# Patient Record
Sex: FEMALE | Race: WHITE | NOT HISPANIC OR LATINO | Employment: UNEMPLOYED | URBAN - METROPOLITAN AREA
[De-identification: names, ages, dates, MRNs, and addresses within clinical notes are randomized per-mention and may not be internally consistent; named-entity substitution may affect disease eponyms.]

---
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
## 2023-01-18 ENCOUNTER — ROUTINE PRENATAL (OUTPATIENT)
Dept: OBSTETRICS AND GYNECOLOGY | Facility: CLINIC | Age: 18
End: 2023-01-18
Payer: COMMERCIAL

## 2023-01-18 VITALS — BODY MASS INDEX: 22.38 KG/M2 | SYSTOLIC BLOOD PRESSURE: 114 MMHG | WEIGHT: 165 LBS | DIASTOLIC BLOOD PRESSURE: 81 MMHG

## 2023-01-18 DIAGNOSIS — O09.32 LATE PRENATAL CARE AFFECTING PREGNANCY IN SECOND TRIMESTER: ICD-10-CM

## 2023-01-18 DIAGNOSIS — R55 VASOVAGAL SYNCOPE: ICD-10-CM

## 2023-01-18 DIAGNOSIS — Z34.02 ENCOUNTER FOR SUPERVISION OF NORMAL FIRST PREGNANCY IN SECOND TRIMESTER: Primary | ICD-10-CM

## 2023-01-18 LAB
GLUCOSE UR STRIP-MCNC: NEGATIVE MG/DL
PROT UR STRIP-MCNC: ABNORMAL MG/DL

## 2023-01-18 PROCEDURE — 99213 OFFICE O/P EST LOW 20 MIN: CPT | Performed by: OBSTETRICS & GYNECOLOGY

## 2023-01-18 NOTE — PROGRESS NOTES
OB follow up     Shelley Lackey is a 17 y.o.  28w2d being seen today for her obstetrical visit.  Patient reports no complaints. Fetal movement: normal.    Her prenatal care is complicated by (and status): syncopal episodes     Review of Systems  Cramping/contractions : none   Vaginal bleeding: none   Fetal movement good     /81   Wt 74.8 kg (165 lb)   LMP 2022 (Approximate)   BMI 22.38 kg/m²     FHT: 134 BPM   Uterine Size: 26 cm       Assessment    Diagnoses and all orders for this visit:    1. Encounter for supervision of normal first pregnancy in second trimester (Primary)    2. Late prenatal care affecting pregnancy in second trimester    3. Vasovagal syncope  -     Ambulatory Referral to Cardiology        1) pregnancy at 28w2d   Rh +, needs 1 hr GTT/CBC   Peds, prenatal classes and tours encouraged  Recommend TDaP and Flu   Encouraged questions about L&D, anesthesia, breast feeding and birth control   2) Hx of recurrent near syncopal and syncopal episodes   Refer cardiology   3) Late onset prenatal care   Compliant since.   BP better today         Plan    Reviewed this stage of pregnancy  Problem list updated   Follow up in 2 weeks    Ramez Bailey MD   2023  15:01 EST

## 2023-01-18 NOTE — Clinical Note
Mindy, having recurrent vasovagal syncopal episodes. Can you see if cardiology could evaluate? Let her know. Thanks, Dr. Bailey

## 2023-01-19 ENCOUNTER — TELEPHONE (OUTPATIENT)
Dept: OBSTETRICS AND GYNECOLOGY | Facility: CLINIC | Age: 18
End: 2023-01-19
Payer: COMMERCIAL

## 2023-01-19 NOTE — TELEPHONE ENCOUNTER
----- Message from Ramez Bailey MD sent at 1/18/2023  3:00 PM EST -----  Mindy, having recurrent vasovagal syncopal episodes. Can you see if cardiology could evaluate? Let her know. Thanks, Dr. Bailey

## 2023-01-19 NOTE — TELEPHONE ENCOUNTER
Pt informed of referral to cardiology.  She will expect a call from [facility] to schedule an appointment.     Office # [phone]

## 2023-01-27 ENCOUNTER — TELEPHONE (OUTPATIENT)
Dept: OBSTETRICS AND GYNECOLOGY | Facility: CLINIC | Age: 18
End: 2023-01-27
Payer: COMMERCIAL

## 2023-01-27 ENCOUNTER — OFFICE VISIT (OUTPATIENT)
Dept: CARDIOLOGY | Facility: CLINIC | Age: 18
End: 2023-01-27
Payer: COMMERCIAL

## 2023-01-27 VITALS
BODY MASS INDEX: 23.13 KG/M2 | HEART RATE: 89 BPM | DIASTOLIC BLOOD PRESSURE: 82 MMHG | WEIGHT: 170.8 LBS | HEIGHT: 72 IN | SYSTOLIC BLOOD PRESSURE: 110 MMHG

## 2023-01-27 DIAGNOSIS — R55 SYNCOPE AND COLLAPSE: Primary | ICD-10-CM

## 2023-01-27 PROCEDURE — 99203 OFFICE O/P NEW LOW 30 MIN: CPT | Performed by: INTERNAL MEDICINE

## 2023-01-27 PROCEDURE — 93000 ELECTROCARDIOGRAM COMPLETE: CPT | Performed by: INTERNAL MEDICINE

## 2023-01-27 NOTE — TELEPHONE ENCOUNTER
----- Message from Rhea Clark MA sent at 1/26/2023  2:38 PM EST -----  L/m for pt/nayeli  ----- Message -----  From: Ramez Bailey MD  Sent: 1/26/2023   1:01 PM EST  To: ANA Gagnon, not anemic on her BS/CBC screen. Passed her glucose test. Please let her know. Thanks, Dr. Bailey

## 2023-01-27 NOTE — PROGRESS NOTES
Subjective:     Encounter Date:01/27/23      Patient ID: Shelley Lackey is a 17 y.o. female.    Chief Complaint:  History of Present Illness    Dear Dr. Bailey,    I had the pleasure of seeing this patient in the office today for initial evaluation and consultation.  I appreciate that you sent her in to see us.  They come in today to be seen for for concern of vasovagal syncope.    Patient is currently 29 weeks pregnant.    Patient has had repeated episodes of syncope.  They state this started about 4 months into her pregnancy.  She has never had this before.  Most of the time she sitting down.  It is occurred many times in the car.  She will blackout, really not have any symptoms prior.  No complaints of chest pain or palpitations.  She will have witnessed shaking in both upper extremities; they have been concerned that she is having seizures.  No loss of bowel or bladder control.  She then will wake up after about a minute and will not have any recollection of what happened.    She has had occasional dizziness on standing.  Never had that before.    She was sent to see us for concern and evaluation of possible vasovagal syncope.    This patient has no known cardiac history.  This patient has no history of coronary artery disease, congestive heart failure, rheumatic fever, rheumatic heart disease, congenital heart disease or heart murmur.  This patient has never required invasive cardiovascular evaluation.        The following portions of the patient's history were reviewed and updated as appropriate: allergies, current medications, past family history, past medical history, past social history, past surgical history and problem list.      ECG 12 Lead    Date/Time: 1/27/2023 9:57 AM  Performed by: Sb Galicia III, MD  Authorized by: Sb Galicia III, MD   Comparison: compared with previous ECG   Similar to previous ECG  Rhythm: sinus rhythm  Rate: normal  Conduction: conduction normal  ST Segments: ST  "segments normal  T Waves: T waves normal  QRS axis: normal  Other: no other findings    Clinical impression: normal ECG               Objective:     Vitals:    01/27/23 0928   BP: 110/82   BP Location: Left arm   Patient Position: Sitting   Pulse: 89   Weight: 77.5 kg (170 lb 12.8 oz)   Height: 182.9 cm (72\")     Body mass index is 23.16 kg/m².      Vitals reviewed.   Constitutional:       General: Not in acute distress.     Appearance: Well-developed. Not diaphoretic.   Eyes:      General:         Right eye: No discharge.         Left eye: No discharge.      Conjunctiva/sclera: Conjunctivae normal.      Pupils: Pupils are equal, round, and reactive to light.   HENT:      Head: Normocephalic and atraumatic.      Nose: Nose normal.   Neck:      Thyroid: No thyromegaly.      Trachea: No tracheal deviation.      Lymphadenopathy: No cervical adenopathy.   Pulmonary:      Effort: Pulmonary effort is normal. No respiratory distress.      Breath sounds: Normal breath sounds. No stridor.   Chest:      Chest wall: Not tender to palpatation.   Cardiovascular:      Normal rate. Regular rhythm.      Murmurs: There is no murmur.      . No S3 gallop. No click. No rub.   Pulses:     Intact distal pulses.   Abdominal:      General: Bowel sounds are normal. There is no distension.      Palpations: Abdomen is soft. There is no abdominal mass.      Tenderness: There is no abdominal tenderness. There is no guarding or rebound.   Musculoskeletal: Normal range of motion.         General: No tenderness or deformity.      Cervical back: Normal range of motion and neck supple. Skin:     General: Skin is warm and dry.      Findings: No erythema or rash.   Neurological:      Mental Status: Alert and oriented to person, place, and time.      Deep Tendon Reflexes: Reflexes are normal and symmetric.   Psychiatric:         Thought Content: Thought content normal.         Data and records reviewed:     Lab Results   Component Value Date    GLUCOSE " 68 12/12/2022    BUN 12 12/12/2022    CREATININE 0.52 (L) 12/12/2022    BCR 23.1 12/12/2022    K 4.2 12/12/2022    CO2 25.0 12/12/2022    CALCIUM 9.6 12/12/2022    PROTENTOTREF 6.2 12/12/2022    ALBUMIN 3.70 12/12/2022    LABIL2 1.5 12/12/2022    AST 13 12/12/2022    ALT 10 12/12/2022     No results found for: CHOL  No results found for: TRIG  No results found for: HDL  No results found for: LDL  No results found for: VLDL  No results found for: LDLHDL  CBC    CBC 11/14/22 12/12/22 1/25/23   WBC 8.6 7.53 8.9   RBC 3.93 3.89 3.80   Hemoglobin 12.4 12.2 11.9   Hematocrit 35.1 35.3 34.5   MCV 89 90.7 91   MCH 31.6 31.4 31.3   MCHC 35.3 34.6 34.5   RDW 12.7 12.1 (A) 11.6 (A)   Platelets 283 337 301   (A) Abnormal value            No radiology results for the last 90 days.          Assessment:          Diagnosis Plan   1. Syncope and collapse  Tilt Table    ECG 12 Lead             Plan:       Syncope and collapse, they are concerned about possible seizures, there is question whether this could be orthostasis and vasovagal syncope.  Story is not classic for that, we will arrange for a tilt table test to be performed.    Thank you very much for allowing us to participate in the care of this pleasant patient.  Please don't hesitate to call if I can be of assistance in any way.      Current Outpatient Medications:   •  Prenatal 28-0.8 MG tablet, Take 1 tablet by mouth Daily. Please use formulary or generic, with DHA ideal, Disp: 90 each, Rfl: 3  •  promethazine (PHENERGAN) 25 MG tablet, Take 1 tablet by mouth Every 6 (Six) Hours As Needed for Nausea or Vomiting., Disp: 30 tablet, Rfl: 2         No follow-ups on file.

## 2023-02-01 ENCOUNTER — TELEPHONE (OUTPATIENT)
Dept: OBSTETRICS AND GYNECOLOGY | Facility: CLINIC | Age: 18
End: 2023-02-01
Payer: COMMERCIAL

## 2023-02-01 ENCOUNTER — ROUTINE PRENATAL (OUTPATIENT)
Dept: OBSTETRICS AND GYNECOLOGY | Facility: CLINIC | Age: 18
End: 2023-02-01
Payer: COMMERCIAL

## 2023-02-01 VITALS — SYSTOLIC BLOOD PRESSURE: 129 MMHG | DIASTOLIC BLOOD PRESSURE: 70 MMHG | BODY MASS INDEX: 22.78 KG/M2 | WEIGHT: 168 LBS

## 2023-02-01 DIAGNOSIS — O26.843 FUNDAL HEIGHT LOW FOR DATES IN THIRD TRIMESTER: ICD-10-CM

## 2023-02-01 DIAGNOSIS — R55 VASOVAGAL SYNCOPE: ICD-10-CM

## 2023-02-01 DIAGNOSIS — Z34.03 ENCOUNTER FOR SUPERVISION OF NORMAL FIRST PREGNANCY IN THIRD TRIMESTER: Primary | ICD-10-CM

## 2023-02-01 LAB
GLUCOSE UR STRIP-MCNC: NEGATIVE MG/DL
PROT UR STRIP-MCNC: NEGATIVE MG/DL

## 2023-02-01 PROCEDURE — 99213 OFFICE O/P EST LOW 20 MIN: CPT | Performed by: OBSTETRICS & GYNECOLOGY

## 2023-02-01 NOTE — PROGRESS NOTES
OB follow up     Shelley Lackey is a 17 y.o.  30w2d being seen today for her obstetrical visit.  Patient reports no complaints. Fetal movement: normal.    Her prenatal care is complicated by (and status): vasovagal syncope     Review of Systems  Cramping/contractions : none   Vaginal bleeding: none   Fetal movement good     /70   Wt 76.2 kg (168 lb)   LMP 2022 (Approximate)   BMI 22.78 kg/m²     FHT: 154 BPM   Uterine Size: 27 cm       Assessment    Diagnoses and all orders for this visit:    1. Encounter for supervision of normal first pregnancy in third trimester (Primary)    2. Vasovagal syncope    3. Fundal height low for dates in third trimester  -     US Ob Follow Up Transabdominal Approach        1) pregnancy at 30w2d   Rh+, BS good and not anemic   2) Vasovagal syncope   Cardiology note appreciated  Wanted tilt table test. So will see if we can help arrange   3) FH low   Check growth next visit.     Watch weight gain, > expected currently         Plan    Reviewed this stage of pregnancy  Problem list updated   Follow up in 2 weeks    Ramez Bailey MD   2023  11:42 EST

## 2023-02-01 NOTE — TELEPHONE ENCOUNTER
----- Message from Ramez Bailey MD sent at 2/1/2023 11:43 AM EST -----  Mindy, cardiology recommended tilt table test. She has not been contacted by hospital to set up? Anything we can do to help. Thanks, Dr. Bailey

## 2023-02-20 ENCOUNTER — ROUTINE PRENATAL (OUTPATIENT)
Dept: OBSTETRICS AND GYNECOLOGY | Facility: CLINIC | Age: 18
End: 2023-02-20
Payer: COMMERCIAL

## 2023-02-20 VITALS — DIASTOLIC BLOOD PRESSURE: 68 MMHG | WEIGHT: 175 LBS | BODY MASS INDEX: 23.73 KG/M2 | SYSTOLIC BLOOD PRESSURE: 104 MMHG

## 2023-02-20 DIAGNOSIS — O26.843 FUNDAL HEIGHT LOW FOR DATES IN THIRD TRIMESTER: ICD-10-CM

## 2023-02-20 DIAGNOSIS — Z34.03 ENCOUNTER FOR SUPERVISION OF NORMAL FIRST PREGNANCY IN THIRD TRIMESTER: Primary | ICD-10-CM

## 2023-02-20 PROCEDURE — 99213 OFFICE O/P EST LOW 20 MIN: CPT | Performed by: OBSTETRICS & GYNECOLOGY

## 2023-02-20 NOTE — PROGRESS NOTES
OB follow up     Shelley Lackey is a 17 y.o.  33w0d being seen today for her obstetrical visit.  Patient reports no complaints. Fetal movement: normal.    Her prenatal care is complicated by (and status): vasovagal syncope     Review of Systems  Cramping/contractions : none   Vaginal bleeding: none   Fetal movement good     /68   Wt 79.4 kg (175 lb)   LMP 2022 (Approximate)   BMI 23.73 kg/m²     FHT: 128 BPM   Uterine Size: 28 cm       Assessment    Diagnoses and all orders for this visit:    1. Encounter for supervision of normal first pregnancy in third trimester (Primary)    2. Fundal height low for dates in third trimester        1) pregnancy at 33w0d   2) FH low   Growth AGA 43%, A/C 33%, cephalic and normal ELIZ   3) Vasovagal syncope   Good overall - no recent episodes       Plan    Reviewed this stage of pregnancy  Problem list updated   Follow up in 2 weeks    Ramez Bailey MD   2023  15:17 EST

## 2023-03-08 ENCOUNTER — ROUTINE PRENATAL (OUTPATIENT)
Dept: OBSTETRICS AND GYNECOLOGY | Facility: CLINIC | Age: 18
End: 2023-03-08
Payer: COMMERCIAL

## 2023-03-08 VITALS — BODY MASS INDEX: 24.55 KG/M2 | WEIGHT: 181 LBS | DIASTOLIC BLOOD PRESSURE: 73 MMHG | SYSTOLIC BLOOD PRESSURE: 125 MMHG

## 2023-03-08 DIAGNOSIS — O26.03 EXCESSIVE WEIGHT GAIN DURING PREGNANCY IN THIRD TRIMESTER: ICD-10-CM

## 2023-03-08 DIAGNOSIS — Z34.03 ENCOUNTER FOR SUPERVISION OF NORMAL FIRST PREGNANCY IN THIRD TRIMESTER: Primary | ICD-10-CM

## 2023-03-08 LAB
GLUCOSE UR STRIP-MCNC: NEGATIVE MG/DL
PROT UR STRIP-MCNC: ABNORMAL MG/DL

## 2023-03-08 PROCEDURE — 90715 TDAP VACCINE 7 YRS/> IM: CPT | Performed by: OBSTETRICS & GYNECOLOGY

## 2023-03-08 PROCEDURE — 90471 IMMUNIZATION ADMIN: CPT | Performed by: OBSTETRICS & GYNECOLOGY

## 2023-03-08 PROCEDURE — 99213 OFFICE O/P EST LOW 20 MIN: CPT | Performed by: OBSTETRICS & GYNECOLOGY

## 2023-03-08 NOTE — PROGRESS NOTES
Ob follow up    Shelley Lackey is a 17 y.o.  35w2d patient being seen today for her obstetrical visit. Patient reports no complaints. Fetal movement: normal.    Her prenatal care is complicated by (and status) : uncomplicated       ROS -   Fetal Movement good   Vaginal bleeding none   Cramping/Contractions none      /73   Wt 82.1 kg (181 lb)   LMP 2022 (Approximate)   BMI 24.55 kg/m²     FHT:  134 BPM    Uterine Size: 32 cm   Presentations: unsure   Pelvic Exam:     Dilation: deferred    Effacement: deferred    Station:  deferred                 Assessment    Diagnoses and all orders for this visit:    1. Encounter for supervision of normal first pregnancy in third trimester (Primary)    2. Excessive weight gain during pregnancy in third trimester    Other orders  -     Tdap Vaccine Greater Than or Equal To 6yo IM        1) Pregnancy at 35w2d  2) Fetal status reassuring -  3) GBS status - to do @ 36 weeks   4) Excessive weight gain in pregnancy   Currently > 40#   Goal of < 0.5# per week reviewed.   Why to limit weight gain discussed.     Plan    [facility] [place] [facility] reviewed         Ramez Bailey MD   3/8/2023  13:01 EST

## 2023-03-13 ENCOUNTER — PATIENT MESSAGE (OUTPATIENT)
Dept: LABOR AND DELIVERY | Facility: HOSPITAL | Age: 18
End: 2023-03-13
Payer: COMMERCIAL

## 2023-03-13 ENCOUNTER — PATIENT OUTREACH (OUTPATIENT)
Dept: LABOR AND DELIVERY | Facility: HOSPITAL | Age: 18
End: 2023-03-13
Payer: COMMERCIAL

## 2023-03-13 NOTE — OUTREACH NOTE
[facility]    Provided Speed [employer] plus 36 wk packets to pt today.  Has never called me back after 3 attempts for intake by [facility].  Will follow for delivery.    Jose Fernández RN  Maternity Nurse Navigator    3/13/2023, 14:54 EDT

## 2023-03-15 ENCOUNTER — ROUTINE PRENATAL (OUTPATIENT)
Dept: OBSTETRICS AND GYNECOLOGY | Facility: CLINIC | Age: 18
End: 2023-03-15
Payer: COMMERCIAL

## 2023-03-15 VITALS — WEIGHT: 179 LBS | SYSTOLIC BLOOD PRESSURE: 126 MMHG | BODY MASS INDEX: 24.28 KG/M2 | DIASTOLIC BLOOD PRESSURE: 79 MMHG

## 2023-03-15 DIAGNOSIS — Z36.9 ANTENATAL SCREENING ENCOUNTER: ICD-10-CM

## 2023-03-15 DIAGNOSIS — O26.03 EXCESSIVE WEIGHT GAIN DURING PREGNANCY IN THIRD TRIMESTER: ICD-10-CM

## 2023-03-15 DIAGNOSIS — Z34.03 ENCOUNTER FOR SUPERVISION OF NORMAL FIRST PREGNANCY IN THIRD TRIMESTER: Primary | ICD-10-CM

## 2023-03-15 LAB
GLUCOSE UR STRIP-MCNC: NEGATIVE MG/DL
PROT UR STRIP-MCNC: ABNORMAL MG/DL

## 2023-03-15 PROCEDURE — 99213 OFFICE O/P EST LOW 20 MIN: CPT | Performed by: OBSTETRICS & GYNECOLOGY

## 2023-03-15 NOTE — PROGRESS NOTES
Ob follow up    Shelley Lackey is a 17 y.o.  36w2d patient being seen today for her obstetrical visit. Patient reports no complaints. Fetal movement: normal.    Her prenatal care is complicated by (and status) : uncomplicated       ROS -   Fetal Movement good   Vaginal bleeding none   Cramping/Contractions none      /79   Wt 81.2 kg (179 lb)   LMP 2022 (Approximate)   BMI 24.28 kg/m²     FHT:  144 BPM    Uterine Size: 33 cm   Presentations: cephalic   Pelvic Exam:     Dilation: 2cm    Effacement: 50%    Station:  -2                 Assessment    Diagnoses and all orders for this visit:    1. Encounter for supervision of normal first pregnancy in third trimester (Primary)    2. Excessive weight gain during pregnancy in third trimester    3.  screening encounter  -     Strep B Screen - , Vaginal/Rectum        1) Pregnancy at 36w2d  2) Fetal status reassuring   3) GBS status -  Done today  4) excessive gain  Good interval change.   Continue to monitor     Plan    Labor warnings   [facility] [place] BID        Ramez Bailey MD   3/15/2023  09:47 EDT

## 2023-03-17 LAB — GP B STREP DNA SPEC QL NAA+PROBE: POSITIVE

## 2023-03-20 PROBLEM — O99.820 GBS (GROUP B STREPTOCOCCUS CARRIER), +RV CULTURE, CURRENTLY PREGNANT: Status: ACTIVE | Noted: 2023-03-20

## 2023-03-22 ENCOUNTER — ROUTINE PRENATAL (OUTPATIENT)
Dept: OBSTETRICS AND GYNECOLOGY | Facility: CLINIC | Age: 18
End: 2023-03-22
Payer: COMMERCIAL

## 2023-03-22 VITALS — DIASTOLIC BLOOD PRESSURE: 76 MMHG | SYSTOLIC BLOOD PRESSURE: 115 MMHG | WEIGHT: 182 LBS | BODY MASS INDEX: 24.68 KG/M2

## 2023-03-22 DIAGNOSIS — O26.03 EXCESSIVE WEIGHT GAIN DURING PREGNANCY IN THIRD TRIMESTER: ICD-10-CM

## 2023-03-22 DIAGNOSIS — O99.820 GBS (GROUP B STREPTOCOCCUS CARRIER), +RV CULTURE, CURRENTLY PREGNANT: ICD-10-CM

## 2023-03-22 DIAGNOSIS — Z34.03 ENCOUNTER FOR SUPERVISION OF NORMAL FIRST PREGNANCY IN THIRD TRIMESTER: Primary | ICD-10-CM

## 2023-03-22 LAB
GLUCOSE UR STRIP-MCNC: NEGATIVE MG/DL
PROT UR STRIP-MCNC: ABNORMAL MG/DL

## 2023-03-22 NOTE — PROGRESS NOTES
Ob follow up    Shelley Lackey is a 17 y.o.  37w2d patient being seen today for her obstetrical visit. Patient reports no complaints. Fetal movement: normal.    Her prenatal care is complicated by (and status) : GBS      ROS -   Fetal Movement good   Vaginal bleeding none   Cramping/Contractions intermittent      /76   Wt 82.6 kg (182 lb)   LMP 2022 (Approximate)   BMI 24.68 kg/m²     FHT:  134 BPM    Uterine Size: 33 cm   Presentations: cephalic   Pelvic Exam:     Dilation: 2cm    Effacement: 50%    Station:  -2                 Assessment    Diagnoses and all orders for this visit:    1. Encounter for supervision of normal first pregnancy in third trimester (Primary)    2. Excessive weight gain during pregnancy in third trimester    3. GBS (group B Streptococcus carrier), +RV culture, currently pregnant        1) Pregnancy at 37w2d  2) Fetal status reassuring   3) GBS status - Positive - reviewed why test, when treat  4) excess weight gain    Consider induction 4/10/23         Plan    Labor warnings   [facility] [place] [place] [facility]        Ramez Bailey MD   3/22/2023  10:07 EDT

## 2023-03-28 ENCOUNTER — HOSPITAL ENCOUNTER (EMERGENCY)
Facility: HOSPITAL | Age: 18
Discharge: HOME | End: 2023-03-28
Attending: OBSTETRICS & GYNECOLOGY | Admitting: OBSTETRICS & GYNECOLOGY
Payer: COMMERCIAL

## 2023-03-28 ENCOUNTER — HOSPITAL ENCOUNTER (INPATIENT)
Facility: HOSPITAL | Age: 18
LOS: 2 days | Discharge: HOME | End: 2023-03-30
Attending: OBSTETRICS & GYNECOLOGY | Admitting: OBSTETRICS & GYNECOLOGY
Payer: COMMERCIAL

## 2023-03-28 ENCOUNTER — ANESTHESIA EVENT (OUTPATIENT)
Dept: LABOR AND DELIVERY | Facility: HOSPITAL | Age: 18
End: 2023-03-28
Payer: COMMERCIAL

## 2023-03-28 ENCOUNTER — ANESTHESIA (OUTPATIENT)
Dept: LABOR AND DELIVERY | Facility: HOSPITAL | Age: 18
End: 2023-03-28
Payer: COMMERCIAL

## 2023-03-28 VITALS
BODY MASS INDEX: 25.47 KG/M2 | WEIGHT: 188 LBS | SYSTOLIC BLOOD PRESSURE: 131 MMHG | RESPIRATION RATE: 18 BRPM | TEMPERATURE: 98.7 F | DIASTOLIC BLOOD PRESSURE: 78 MMHG | HEART RATE: 84 BPM | HEIGHT: 72 IN

## 2023-03-28 PROBLEM — Z34.90 PREGNANCY: Status: ACTIVE | Noted: 2023-03-28

## 2023-03-28 LAB
ABO GROUP BLD: NORMAL
ALBUMIN SERPL-MCNC: 3.8 G/DL (ref 3.5–5.2)
ALBUMIN/GLOB SERPL: 1.2 G/DL
ALP SERPL-CCNC: 147 U/L (ref 39–117)
ALT SERPL W P-5'-P-CCNC: 7 U/L (ref 1–33)
ANION GAP SERPL CALCULATED.3IONS-SCNC: 18 MMOL/L (ref 5–15)
AST SERPL-CCNC: 15 U/L (ref 1–32)
BACTERIA UR QL AUTO: ABNORMAL /HPF
BASOPHILS # BLD AUTO: 0.03 10*3/MM3 (ref 0–0.2)
BASOPHILS NFR BLD AUTO: 0.2 % (ref 0–1.5)
BILIRUB SERPL-MCNC: 0.4 MG/DL (ref 0–1.2)
BILIRUB UR QL STRIP: NEGATIVE
BLD GP AB SCN SERPL QL: NEGATIVE
BUN SERPL-MCNC: 8 MG/DL (ref 6–20)
BUN/CREAT SERPL: 10.5 (ref 7–25)
CALCIUM SPEC-SCNC: 9.3 MG/DL (ref 8.6–10.5)
CHLORIDE SERPL-SCNC: 98 MMOL/L (ref 98–107)
CLARITY UR: ABNORMAL
CO2 SERPL-SCNC: 20 MMOL/L (ref 22–29)
COLOR UR: YELLOW
CREAT SERPL-MCNC: 0.76 MG/DL (ref 0.57–1)
DEPRECATED RDW RBC AUTO: 38 FL (ref 37–54)
EGFRCR SERPLBLD CKD-EPI 2021: 115.9 ML/MIN/1.73
EOSINOPHIL # BLD AUTO: 0 10*3/MM3 (ref 0–0.4)
EOSINOPHIL NFR BLD AUTO: 0 % (ref 0.3–6.2)
ERYTHROCYTE [DISTWIDTH] IN BLOOD BY AUTOMATED COUNT: 11.7 % (ref 12.3–15.4)
GLOBULIN UR ELPH-MCNC: 3.3 GM/DL
GLUCOSE SERPL-MCNC: 142 MG/DL (ref 65–99)
GLUCOSE UR STRIP-MCNC: NEGATIVE MG/DL
HCT VFR BLD AUTO: 39.8 % (ref 34–46.6)
HGB BLD-MCNC: 13.9 G/DL (ref 12–15.9)
HGB UR QL STRIP.AUTO: ABNORMAL
HYALINE CASTS UR QL AUTO: ABNORMAL /LPF
IMM GRANULOCYTES # BLD AUTO: 0.1 10*3/MM3 (ref 0–0.05)
IMM GRANULOCYTES NFR BLD AUTO: 0.6 % (ref 0–0.5)
KETONES UR QL STRIP: NEGATIVE
LEUKOCYTE ESTERASE UR QL STRIP.AUTO: ABNORMAL
LYMPHOCYTES # BLD AUTO: 0.73 10*3/MM3 (ref 0.7–3.1)
LYMPHOCYTES NFR BLD AUTO: 4.3 % (ref 19.6–45.3)
MCH RBC QN AUTO: 31.2 PG (ref 26.6–33)
MCHC RBC AUTO-ENTMCNC: 34.9 G/DL (ref 31.5–35.7)
MCV RBC AUTO: 89.2 FL (ref 79–97)
MONOCYTES # BLD AUTO: 0.42 10*3/MM3 (ref 0.1–0.9)
MONOCYTES NFR BLD AUTO: 2.5 % (ref 5–12)
NEUTROPHILS NFR BLD AUTO: 15.53 10*3/MM3 (ref 1.7–7)
NEUTROPHILS NFR BLD AUTO: 92.4 % (ref 42.7–76)
NITRITE UR QL STRIP: NEGATIVE
NRBC BLD AUTO-RTO: 0 /100 WBC (ref 0–0.2)
PH UR STRIP.AUTO: 7 [PH] (ref 5–8)
PLATELET # BLD AUTO: 280 10*3/MM3 (ref 140–450)
PMV BLD AUTO: 9.8 FL (ref 6–12)
POTASSIUM SERPL-SCNC: 3.5 MMOL/L (ref 3.5–5.2)
PROT SERPL-MCNC: 7.1 G/DL (ref 6–8.5)
PROT UR QL STRIP: ABNORMAL
RBC # BLD AUTO: 4.46 10*6/MM3 (ref 3.77–5.28)
RBC # UR STRIP: ABNORMAL /HPF
REF LAB TEST METHOD: ABNORMAL
RH BLD: POSITIVE
SODIUM SERPL-SCNC: 136 MMOL/L (ref 136–145)
SP GR UR STRIP: 1.02 (ref 1–1.03)
SQUAMOUS #/AREA URNS HPF: ABNORMAL /HPF
T&S EXPIRATION DATE: NORMAL
UROBILINOGEN UR QL STRIP: ABNORMAL
WBC # UR STRIP: ABNORMAL /HPF
WBC NRBC COR # BLD: 16.81 10*3/MM3 (ref 3.4–10.8)

## 2023-03-28 PROCEDURE — 87086 URINE CULTURE/COLONY COUNT: CPT | Performed by: OBSTETRICS & GYNECOLOGY

## 2023-03-28 PROCEDURE — 81001 URINALYSIS AUTO W/SCOPE: CPT | Performed by: OBSTETRICS & GYNECOLOGY

## 2023-03-28 PROCEDURE — 59025 FETAL NON-STRESS TEST: CPT

## 2023-03-28 PROCEDURE — S0260 H&P FOR SURGERY: HCPCS | Performed by: OBSTETRICS & GYNECOLOGY

## 2023-03-28 PROCEDURE — 86850 RBC ANTIBODY SCREEN: CPT | Performed by: OBSTETRICS & GYNECOLOGY

## 2023-03-28 PROCEDURE — 80053 COMPREHEN METABOLIC PANEL: CPT | Performed by: OBSTETRICS & GYNECOLOGY

## 2023-03-28 PROCEDURE — C1755 CATHETER, INTRASPINAL: HCPCS | Performed by: ANESTHESIOLOGY

## 2023-03-28 PROCEDURE — 85025 COMPLETE CBC W/AUTO DIFF WBC: CPT | Performed by: OBSTETRICS & GYNECOLOGY

## 2023-03-28 PROCEDURE — 0UQKXZZ REPAIR HYMEN, EXTERNAL APPROACH: ICD-10-PCS | Performed by: OBSTETRICS & GYNECOLOGY

## 2023-03-28 PROCEDURE — 99284 EMERGENCY DEPT VISIT MOD MDM: CPT | Performed by: OBSTETRICS & GYNECOLOGY

## 2023-03-28 PROCEDURE — 86901 BLOOD TYPING SEROLOGIC RH(D): CPT | Performed by: OBSTETRICS & GYNECOLOGY

## 2023-03-28 PROCEDURE — 86900 BLOOD TYPING SEROLOGIC ABO: CPT | Performed by: OBSTETRICS & GYNECOLOGY

## 2023-03-28 PROCEDURE — 99202 OFFICE O/P NEW SF 15 MIN: CPT | Performed by: OBSTETRICS & GYNECOLOGY

## 2023-03-28 PROCEDURE — 59410 OBSTETRICAL CARE: CPT | Performed by: OBSTETRICS & GYNECOLOGY

## 2023-03-28 PROCEDURE — 25010000002 PENICILLIN G POTASSIUM PER 600000 UNITS: Performed by: OBSTETRICS & GYNECOLOGY

## 2023-03-28 RX ORDER — ERYTHROMYCIN 5 MG/G
OINTMENT OPHTHALMIC
Status: ACTIVE
Start: 2023-03-28 | End: 2023-03-29

## 2023-03-28 RX ORDER — PENICILLIN G 3000000 [IU]/50ML
3 INJECTION, SOLUTION INTRAVENOUS EVERY 4 HOURS
Status: DISCONTINUED | OUTPATIENT
Start: 2023-03-28 | End: 2023-03-28 | Stop reason: HOSPADM

## 2023-03-28 RX ORDER — SODIUM CHLORIDE 0.9 % (FLUSH) 0.9 %
1-10 SYRINGE (ML) INJECTION AS NEEDED
Status: DISCONTINUED | OUTPATIENT
Start: 2023-03-28 | End: 2023-03-30 | Stop reason: HOSPADM

## 2023-03-28 RX ORDER — IBUPROFEN 600 MG/1
600 TABLET ORAL EVERY 6 HOURS PRN
Status: DISCONTINUED | OUTPATIENT
Start: 2023-03-28 | End: 2023-03-30 | Stop reason: HOSPADM

## 2023-03-28 RX ORDER — SODIUM CHLORIDE, SODIUM LACTATE, POTASSIUM CHLORIDE, CALCIUM CHLORIDE 600; 310; 30; 20 MG/100ML; MG/100ML; MG/100ML; MG/100ML
125 INJECTION, SOLUTION INTRAVENOUS CONTINUOUS
Status: DISCONTINUED | OUTPATIENT
Start: 2023-03-28 | End: 2023-03-28

## 2023-03-28 RX ORDER — BISACODYL 10 MG
10 SUPPOSITORY, RECTAL RECTAL DAILY PRN
Status: DISCONTINUED | OUTPATIENT
Start: 2023-03-29 | End: 2023-03-30 | Stop reason: HOSPADM

## 2023-03-28 RX ORDER — ONDANSETRON 2 MG/ML
4 INJECTION INTRAMUSCULAR; INTRAVENOUS EVERY 6 HOURS PRN
Status: DISCONTINUED | OUTPATIENT
Start: 2023-03-28 | End: 2023-03-28 | Stop reason: HOSPADM

## 2023-03-28 RX ORDER — TRANEXAMIC ACID 10 MG/ML
1000 INJECTION, SOLUTION INTRAVENOUS ONCE AS NEEDED
Status: DISCONTINUED | OUTPATIENT
Start: 2023-03-28 | End: 2023-03-28

## 2023-03-28 RX ORDER — EPHEDRINE SULFATE 50 MG/ML
5 INJECTION, SOLUTION INTRAVENOUS
Status: DISCONTINUED | OUTPATIENT
Start: 2023-03-28 | End: 2023-03-28 | Stop reason: HOSPADM

## 2023-03-28 RX ORDER — DOCUSATE SODIUM 100 MG/1
100 CAPSULE, LIQUID FILLED ORAL 2 TIMES DAILY
Status: DISCONTINUED | OUTPATIENT
Start: 2023-03-28 | End: 2023-03-30 | Stop reason: HOSPADM

## 2023-03-28 RX ORDER — ONDANSETRON 2 MG/ML
4 INJECTION INTRAMUSCULAR; INTRAVENOUS EVERY 6 HOURS PRN
Status: DISCONTINUED | OUTPATIENT
Start: 2023-03-28 | End: 2023-03-30 | Stop reason: HOSPADM

## 2023-03-28 RX ORDER — ONDANSETRON 4 MG/1
4 TABLET, FILM COATED ORAL EVERY 6 HOURS PRN
Status: DISCONTINUED | OUTPATIENT
Start: 2023-03-28 | End: 2023-03-30 | Stop reason: HOSPADM

## 2023-03-28 RX ORDER — ACETAMINOPHEN 325 MG/1
650 TABLET ORAL EVERY 6 HOURS PRN
Status: DISCONTINUED | OUTPATIENT
Start: 2023-03-28 | End: 2023-03-30 | Stop reason: HOSPADM

## 2023-03-28 RX ORDER — ONDANSETRON 2 MG/ML
4 INJECTION INTRAMUSCULAR; INTRAVENOUS ONCE AS NEEDED
Status: DISCONTINUED | OUTPATIENT
Start: 2023-03-28 | End: 2023-03-28 | Stop reason: HOSPADM

## 2023-03-28 RX ORDER — PROMETHAZINE HYDROCHLORIDE 25 MG/1
25 TABLET ORAL EVERY 6 HOURS PRN
Status: DISCONTINUED | OUTPATIENT
Start: 2023-03-28 | End: 2023-03-30 | Stop reason: HOSPADM

## 2023-03-28 RX ORDER — PHYTONADIONE 1 MG/.5ML
INJECTION, EMULSION INTRAMUSCULAR; INTRAVENOUS; SUBCUTANEOUS
Status: ACTIVE
Start: 2023-03-28 | End: 2023-03-29

## 2023-03-28 RX ORDER — FENTANYL CIT 0.2 MG/100ML-ROPIV 0.2%-NACL 0.9% EPIDURAL INJ 2/0.2 MCG/ML-%
10 SOLUTION INJECTION CONTINUOUS
Status: DISCONTINUED | OUTPATIENT
Start: 2023-03-28 | End: 2023-03-28

## 2023-03-28 RX ORDER — ONDANSETRON 4 MG/1
4 TABLET, FILM COATED ORAL EVERY 6 HOURS PRN
Status: DISCONTINUED | OUTPATIENT
Start: 2023-03-28 | End: 2023-03-28 | Stop reason: HOSPADM

## 2023-03-28 RX ORDER — DIPHENHYDRAMINE HYDROCHLORIDE 50 MG/ML
12.5 INJECTION INTRAMUSCULAR; INTRAVENOUS EVERY 8 HOURS PRN
Status: DISCONTINUED | OUTPATIENT
Start: 2023-03-28 | End: 2023-03-28 | Stop reason: HOSPADM

## 2023-03-28 RX ORDER — HYDROCORTISONE 25 MG/G
1 CREAM TOPICAL AS NEEDED
Status: DISCONTINUED | OUTPATIENT
Start: 2023-03-28 | End: 2023-03-30 | Stop reason: HOSPADM

## 2023-03-28 RX ORDER — TERBUTALINE SULFATE 1 MG/ML
0.25 INJECTION, SOLUTION SUBCUTANEOUS AS NEEDED
Status: DISCONTINUED | OUTPATIENT
Start: 2023-03-28 | End: 2023-03-28 | Stop reason: HOSPADM

## 2023-03-28 RX ORDER — PRENATAL VIT/IRON FUM/FOLIC AC 27MG-0.8MG
1 TABLET ORAL DAILY
Status: DISCONTINUED | OUTPATIENT
Start: 2023-03-28 | End: 2023-03-30 | Stop reason: HOSPADM

## 2023-03-28 RX ORDER — OXYTOCIN/0.9 % SODIUM CHLORIDE 30/500 ML
125 PLASTIC BAG, INJECTION (ML) INTRAVENOUS CONTINUOUS PRN
Status: COMPLETED | OUTPATIENT
Start: 2023-03-28 | End: 2023-03-28

## 2023-03-28 RX ORDER — MAGNESIUM CARB/ALUMINUM HYDROX 105-160MG
30 TABLET,CHEWABLE ORAL ONCE AS NEEDED
Status: DISCONTINUED | OUTPATIENT
Start: 2023-03-28 | End: 2023-03-28 | Stop reason: HOSPADM

## 2023-03-28 RX ORDER — OXYTOCIN/0.9 % SODIUM CHLORIDE 30/500 ML
999 PLASTIC BAG, INJECTION (ML) INTRAVENOUS ONCE
Status: COMPLETED | OUTPATIENT
Start: 2023-03-28 | End: 2023-03-28

## 2023-03-28 RX ORDER — METHYLERGONOVINE MALEATE 0.2 MG/ML
200 INJECTION INTRAVENOUS ONCE AS NEEDED
Status: DISCONTINUED | OUTPATIENT
Start: 2023-03-28 | End: 2023-03-28

## 2023-03-28 RX ORDER — FAMOTIDINE 10 MG/ML
20 INJECTION, SOLUTION INTRAVENOUS 2 TIMES DAILY PRN
Status: DISCONTINUED | OUTPATIENT
Start: 2023-03-28 | End: 2023-03-28 | Stop reason: HOSPADM

## 2023-03-28 RX ORDER — OXYTOCIN/0.9 % SODIUM CHLORIDE 30/500 ML
250 PLASTIC BAG, INJECTION (ML) INTRAVENOUS CONTINUOUS
Status: DISPENSED | OUTPATIENT
Start: 2023-03-28 | End: 2023-03-28

## 2023-03-28 RX ORDER — MISOPROSTOL 200 UG/1
800 TABLET ORAL ONCE AS NEEDED
Status: DISCONTINUED | OUTPATIENT
Start: 2023-03-28 | End: 2023-03-28

## 2023-03-28 RX ORDER — CARBOPROST TROMETHAMINE 250 UG/ML
250 INJECTION, SOLUTION INTRAMUSCULAR
Status: DISCONTINUED | OUTPATIENT
Start: 2023-03-28 | End: 2023-03-28

## 2023-03-28 RX ORDER — PROMETHAZINE HYDROCHLORIDE 12.5 MG/1
12.5 SUPPOSITORY RECTAL EVERY 6 HOURS PRN
Status: DISCONTINUED | OUTPATIENT
Start: 2023-03-28 | End: 2023-03-30 | Stop reason: HOSPADM

## 2023-03-28 RX ORDER — FAMOTIDINE 20 MG/1
20 TABLET, FILM COATED ORAL 2 TIMES DAILY PRN
Status: DISCONTINUED | OUTPATIENT
Start: 2023-03-28 | End: 2023-03-28 | Stop reason: HOSPADM

## 2023-03-28 RX ORDER — HYDROCODONE BITARTRATE AND ACETAMINOPHEN 5; 325 MG/1; MG/1
1 TABLET ORAL EVERY 4 HOURS PRN
Status: DISCONTINUED | OUTPATIENT
Start: 2023-03-28 | End: 2023-03-30 | Stop reason: HOSPADM

## 2023-03-28 RX ORDER — ACETAMINOPHEN 325 MG/1
650 TABLET ORAL EVERY 4 HOURS PRN
Status: DISCONTINUED | OUTPATIENT
Start: 2023-03-28 | End: 2023-03-28 | Stop reason: HOSPADM

## 2023-03-28 RX ORDER — HYDROCODONE BITARTRATE AND ACETAMINOPHEN 10; 325 MG/1; MG/1
1 TABLET ORAL EVERY 4 HOURS PRN
Status: DISCONTINUED | OUTPATIENT
Start: 2023-03-28 | End: 2023-03-30 | Stop reason: HOSPADM

## 2023-03-28 RX ADMIN — Medication 125 ML/HR: at 17:15

## 2023-03-28 RX ADMIN — Medication 10 ML/HR: at 12:16

## 2023-03-28 RX ADMIN — Medication: at 20:32

## 2023-03-28 RX ADMIN — Medication 999 ML/HR: at 15:55

## 2023-03-28 RX ADMIN — IBUPROFEN 600 MG: 600 TABLET ORAL at 17:14

## 2023-03-28 RX ADMIN — ACETAMINOPHEN 650 MG: 325 TABLET, FILM COATED ORAL at 20:32

## 2023-03-28 RX ADMIN — SODIUM CHLORIDE, POTASSIUM CHLORIDE, SODIUM LACTATE AND CALCIUM CHLORIDE 1000 ML: 600; 310; 30; 20 INJECTION, SOLUTION INTRAVENOUS at 11:59

## 2023-03-28 RX ADMIN — LIDOCAINE HYDROCHLORIDE 5 ML: 10; .005 INJECTION, SOLUTION EPIDURAL; INFILTRATION; INTRACAUDAL; PERINEURAL at 12:14

## 2023-03-28 RX ADMIN — LIDOCAINE HYDROCHLORIDE 5 ML: 10; .005 INJECTION, SOLUTION EPIDURAL; INFILTRATION; INTRACAUDAL; PERINEURAL at 12:16

## 2023-03-28 RX ADMIN — PENICILLIN G POTASSIUM 5 MILLION UNITS: 5000000 INJECTION, POWDER, FOR SOLUTION INTRAMUSCULAR; INTRAVENOUS at 12:00

## 2023-03-28 NOTE — ANESTHESIA PROCEDURE NOTES
Labor Epidural      Patient reassessed immediately prior to procedure    Patient location during procedure: OB  Start Time: 3/28/2023 12:08 PM  Stop Time: 3/28/2023 12:14 PM  Performed By  Anesthesiologist: Otoniel Pacheco MD  Preanesthetic Checklist  Completed: patient identified, site marked, risks and benefits discussed, surgical consent, monitors and equipment checked, pre-op evaluation and timeout performed  Prep:  Pt Position:sitting  Sterile Tech:cap, gloves, mask and sterile barrier  Prep:chlorhexidine gluconate and isopropyl alcohol  Monitoring:blood pressure monitoring, continuous pulse oximetry and EKG  Epidural Block Procedure:  Approach:midline  Guidance:landmark technique  Location:L3-L4  Needle Type:Tuohy  Needle Gauge:17  Loss of Resistance Medium: saline  Paresthesia: none  Aspiration:negative  Test Dose:negative  Number of Attempts: 1  Post Assessment:  Dressing:occlusive dressing applied and secured with tape  Pt Tolerance:patient tolerated the procedure well with no apparent complications  Complications:no

## 2023-03-28 NOTE — LACTATION NOTE
This note was copied from a baby's chart.  Baby is listed as  formula feeding. LC rounded on mom to verify feeding and she confirmed it. Advised mother to wear sports or some kind of tight bra to suppress milk production. PT denies any question.

## 2023-03-28 NOTE — ANESTHESIA POSTPROCEDURE EVALUATION
Patient: Shelley Lackey    Procedure Summary     Date: 03/28/23 Room / Location:     Anesthesia Start: 1208 Anesthesia Stop: 1553    Procedure: LABOR ANALGESIA Diagnosis:     Scheduled Providers:  Provider: Otoniel Pacheco MD    Anesthesia Type: epidural ASA Status: 2          Anesthesia Type: epidural    Vitals  Vitals Value Taken Time   /90 03/28/23 1730   Temp 36.7 °C (98 °F) 03/28/23 1617   Pulse 96 03/28/23 1730   Resp 16 03/28/23 1645   SpO2 99 % 03/28/23 1630   Vitals shown include unvalidated device data.        Anesthesia Post Evaluation

## 2023-03-28 NOTE — H&P
[facility]  Obstetric History and Physical    Chief Complaint   Patient presents with   • Laboring     Pt presents in labor at 38w1d         Subjective     Patient is a 17 y.o. female  currently at 38w1d, who presents with labor.  Pregnancy has been complicated by positive GBS status.    The following portions of the patients history were reviewed and updated as appropriate: current medications, allergies, past medical history, past surgical history, past family history, past social history and problem list .       Prenatal Information:  Prenatal Results     POC Urine Glucose/Protein     Test Value Reference Range Date Time    Urine Glucose ^ Negative  Negative 23     Urine Protein ^ Trace  Negative 23           Initial Prenatal Labs     Test Value Reference Range Date Time    Hemoglobin  12.4 g/dL 11.1 - 15.9 22 1448    Hematocrit  35.1 % 34.0 - 46.6 22 1448    Platelets  283 x10E3/uL 150 - 450 22 1448    Rubella IgG  4.11 index Immune >0.99 22 1448    Hepatitis B SAg  Negative  Negative 22 1448    Hepatitis C Ab  <0.1 s/co ratio 0.0 - 0.9 22 1448    RPR  Non Reactive  Non Reactive 22 1448    T. Pallidum Ab         ABO  A   22 1448    Rh  Positive   22 1448    Antibody Screen  Negative  Negative 22 1448    HIV  Non Reactive  Non Reactive 22 1448    Urine Culture  Culture in progress   23 0242       Final report   22 0318    Gonorrhea  Negative  Negative 22 0319    Chlamydia  Negative  Negative 22 0319    TSH        HgB A1c               2nd and 3rd Trimester     Test Value Reference Range Date Time    Hemoglobin (repeated)  11.9 g/dL 11.1 - 15.9 23 1418       12.2 g/dL 12.0 - 15.9 22 1221    Hematocrit (repeated)  34.5 % 34.0 - 46.6 23 1418       35.3 % 34.0 - 46.6 22 1221    Platelets   301 x10E3/uL 150 - 450 23 1418       337 10*3/mm3 140 - 450 22 1221       283 x10E3/uL  150 - 450 11/14/22 1448    GCT  112 mg/dL 70 - 139 01/25/23 1418    Antibody Screen (repeated)        GTT Fasting        GTT 1 Hr        GTT 2 Hr        GTT 3 Hr        Group B Strep  Positive  Negative 03/15/23 0123          Drug Screening     Test Value Reference Range Date Time    Amphetamine Screen  Negative ng/mL Tsrtdd=4835 11/14/22 0316    Barbiturate Screen  Negative ng/mL Ihzohz=123 11/14/22 0316    Benzodiazepine Screen  Negative ng/mL Gyrnuw=768 11/14/22 0316    Methadone Screen  Negative ng/mL Ypoaax=390 11/14/22 0316    Phencyclidine Screen  Negative ng/mL Cutoff=25 11/14/22 0316    Opiates Screen  Negative ng/mL Zwrewk=438 11/14/22 0316    THC Screen  Positive  Cutoff=50 11/14/22 0316    Cocaine Screen  Negative ng/mL Whwsjh=748 11/14/22 0316    Propoxyphene Screen  Negative ng/mL Zyprtz=334 11/14/22 0316    Buprenorphine Screen        Methamphetamine Screen        Oxycodone Screen        Tricyclic Antidepressants Screen              Other (Risk screening)     Test Value Reference Range Date Time    Varicella IgG        Parvovirus IgG        CMV IgG        Cystic Fibrosis        Hemoglobin electrophoresis        NIPT        MSAFP-4        AFP (for NTD only)              Legend    ^: Historical                      External Prenatal Results     Pregnancy Outside Results - Transcribed From Office Records - See Scanned Records For Details     Test Value Date Time    ABO  A  11/14/22 1448    Rh  Positive  11/14/22 1448    Antibody Screen  Negative  11/14/22 1448    Varicella IgG       Rubella  4.11 index 11/14/22 1448    Hgb  11.9 g/dL 01/25/23 1418       12.2 g/dL 12/12/22 1221       12.4 g/dL 11/14/22 1448    Hct  34.5 % 01/25/23 1418       35.3 % 12/12/22 1221       35.1 % 11/14/22 1448    Glucose Fasting GTT       Glucose Tolerance Test 1 hour       Glucose Tolerance Test 3 hour       Gonorrhea (discrete)  Negative  11/14/22 0319    Chlamydia (discrete)  Negative  11/14/22 0319    RPR  Non Reactive   22 1448    VDRL       Syphilis Antibody       HBsAg  Negative  22 1448    Herpes Simplex Virus PCR       Herpes Simplex VIrus Culture       HIV  Non Reactive  22 1448    Hep C RNA Quant PCR       Hep C Antibody  <0.1 s/co ratio 22 1448    AFP       Group B Strep  Positive  03/15/23 0123    GBS Susceptibility to Clindamycin       GBS Susceptibility to Erythromycin       Fetal Fibronectin       Genetic Testing, Maternal Blood             Drug Screening     Test Value Date Time    Urine Drug Screen       Amphetamine Screen  Negative ng/mL 22    Barbiturate Screen  Negative ng/mL 22    Benzodiazepine Screen  Negative ng/mL 22    Methadone Screen  Negative ng/mL 22    Phencyclidine Screen  Negative ng/mL 22    Opiates Screen       THC Screen       Cocaine Screen       Propoxyphene Screen  Negative ng/mL 22    Buprenorphine Screen       Methamphetamine Screen       Oxycodone Screen       Tricyclic Antidepressants Screen             Legend    ^: Historical                         Past OB History:     OB History    Para Term  AB Living   1 0 0 0 0 0   SAB IAB Ectopic Molar Multiple Live Births   0 0 0 0 0 0      # Outcome Date GA Lbr Reddy/2nd Weight Sex Delivery Anes PTL Lv   1 Current                Past Medical History: Past Medical History:   Diagnosis Date   • Varicella       Past Surgical History History reviewed. No pertinent surgical history.   Family History: Family History   Problem Relation Age of Onset   • Colon cancer Maternal Grandfather    • Breast cancer Neg Hx    • Ovarian cancer Neg Hx    • Uterine cancer Neg Hx    • Deep vein thrombosis Neg Hx    • Pulmonary embolism Neg Hx       Social History:  reports that she has quit smoking. Her smoking use included cigarettes. She has never used smokeless tobacco.   reports no history of alcohol use.   reports no history of drug use.        General ROS: Pertinent  items are noted in HPI, all other systems reviewed and negative    Objective       Vital Signs Range for the last 24 hours  Temperature: Temp:  [98.7 °F (37.1 °C)] 98.7 °F (37.1 °C)   Temp Source: Temp src: Oral   BP: BP: (131)/(78) 131/78   Pulse: Heart Rate:  [84] 84   Respirations: Resp:  [18] 18   SPO2:     O2 Amount (l/min):     O2 Devices     Weight: Weight:  [83.1 kg (183 lb 3.2 oz)-85.3 kg (188 lb)] 85.3 kg (188 lb)     Physical Examination: General appearance - alert, well appearing, and in no distress  Abdomen -gravid, nontender  Pelvic -cervix is 8 to 9 cm, 100% effaced, 0 station.  Forebag is noted.  Extremities - peripheral pulses normal, no pedal edema, no clubbing or cyanosis    Presentation:  Vertex   Cervix: Exam by: Method: sterile exam per RN   Dilation: Cervical Dilation (cm): 6-7   Effacement: Cervical Effacement: 100%   Station:         Fetal Heart Rate Assessment   Method:     Beats/min:     Baseline:     Variability:     Accels:     Decels:     Tracing Category:       Uterine Assessment   Method:     Frequency (min):     Ctx Count in 10 min:     Duration:     Intensity:     Intensity by IUPC:     Resting Tone:     Resting Tone by IUPC:     Neetu Units:         Assessment & Plan       Pregnancy    GBS (group B Streptococcus carrier), +RV culture, currently pregnant        Assessment:  1.  Intrauterine pregnancy at 38w1d gestation with reassuring fetal status.    2.  labor  with ROM  3.  Obstetrical history significant for GBS positive status.  4.  GBS status:   Strep Gp B ZANE   Date Value Ref Range Status   03/15/2023 Positive (A) Negative Final     Comment:     Centers for Disease Control and Prevention (CDC) and American Congress  of Obstetricians and Gynecologists (ACOG) guidelines for prevention of   group B streptococcal (GBS) disease specify co-collection of  a vaginal and rectal swab specimen to maximize sensitivity of GBS  detection. Per the CDC and ACOG, swabbing both  the lower vagina and  rectum substantially increases the yield of detection compared with  sampling the vagina alone.  Penicillin G, ampicillin, or cefazolin are indicated for intrapartum  prophylaxis of  GBS colonization. Reflex susceptibility  testing should be performed prior to use of clindamycin only on GBS  isolates from penicillin-allergic women who are considered a high risk  for anaphylaxis. Treatment with vancomycin without additional testing  is warranted if resistance to clindamycin is noted.         Plan:  1. expectant management, analgesia with  epidural and antibiotic for GBS  2. Plan of care has been reviewed with patient and family  3.  Risks, benefits of treatment plan have been discussed.  4.  All questions have been answered.      Radha Fuentes MD  3/28/2023  11:56 EDT

## 2023-03-28 NOTE — L&D DELIVERY NOTE
[facility]   Vaginal Delivery Note    Patient Name: Shelley Lackey  : 2005  MRN: [id number]    Date of Delivery: 3/28/2023     Diagnosis     Pre & Post-Delivery:  Intrauterine pregnancy at 38w1d  Labor status: Spontaneous Onset of Labor      (normal spontaneous vaginal delivery)    GBS (group B Streptococcus carrier), +RV culture, currently pregnant    Pregnancy             Problem List    Transfer to Postpartum     Review the Delivery Report for details.     Delivery     Delivery: Vaginal, Spontaneous     YOB: 2025    Time of Birth:  Gestational Age 3:53 PM   38w1d     Anesthesia:      Delivering clinician: Radha Fuentes    Forceps?   No   Vacuum? No    Shoulder dystocia present: No        Delivery narrative: Patient is a 17-year-old  that presented at 38 weeks and 1 day in active labor.  She made quick change from 6 cm to 8 cm and desired an epidural.  She was leaking clear fluid on admission, but was noted to have a forebag.  She was started on penicillin for GBS positive status.  She did receive an epidural for pain control.  When she was completely dilated, she had assisted rupture of membranes of the forebag for clear fluid.    She pushed for approximately 1 hour and delivered a liveborn female in the occiput anterior position over an intact perineum.  With gentle posterior guidance of the fetal head and maternal pushing, the right anterior shoulder was easily delivered, followed by the remainder of the body.  The infant was immediately vigorous and placed on mom's chest.  Delayed cord clamping was performed for 30 seconds and cord was clamped and cut by the maternal grandmother.  Cord blood was collected and sent.  The placenta then delivered spontaneously and was intact with a three-vessel cord.  This was discarded.  Inspection of her perineum revealed a hymenal tear and this was made hemostatic with 1 figure-of-eight stitch of 3-0 Vicryl.  Perineum was hemostatic.   Bimanual exam revealed a firm uterus with no remaining products of conception.  She tolerated the procedure well.  All counts were correct at the end of the procedure.      Infant     Findings: female  infant     Infant observations: Weight: 3062 g (6 lb 12 oz)   Length: 20  in  Observations/Comments:  scale 2      Apgars: 6  @ 1 minute /    9  @ 5 minutes   Infant Name:      Placenta & Cord         Placenta delivered  Spontaneous  at   3/28/2023  3:55 PM     Cord: 3 vessels  present.   Nuchal Cord?  no   Cord blood obtained: Yes    Cord gases obtained:  No    Cord gas results: Venous:  No results found for: PHCVEN    Arterial:  No results found for: PHCART     Repair      Episiotomy: None     No    Lacerations: Yes  Laceration Information  Laceration Repaired?   Perineal: None      Periurethral:       Labial:       Sulcus:       Vaginal: Yes  Yes   Cervical:         Suture used for repair: 3-0 Vicryl   Estimated Blood Loss:       Quantitative Blood Loss:          Complications     none    Disposition     Mother to Mother Baby/Postpartum  in stable condition currently.  Baby to remains with mom  in stable condition currently.    Radha Fuentes MD  03/28/23  17:01 EDT

## 2023-03-28 NOTE — PROGRESS NOTES
Patient is comfortable with epidural.  SVE 10/100%/0.  AROM performed of forebag with return of clear fluid.  Will likely begin pushing in the next hour.

## 2023-03-28 NOTE — OBED NOTES
"[facility]  Shelley Lackey  : 2005  MRN: [id number]  CSN: [id number]    OB ED Provider Note    Subjective   Chief Complaint   Patient presents with   • Contractions     JIN  38.1 weeks presents with regular ctx every few minutes apart starting at 10pm tonight, denies any leaking or bleeding, reports +Fm.     Shelley Lackey is a 17 y.o. year old  with an Estimated Date of Delivery: 4/10/23 currently at 38w1d presenting with CTX since 2200. Initially they were every 10-20 min, but have increased in frequency to every 5 min along with increased intensity.  She has noted some loss of blood tinged mucus.  No ROM noted. FM is present.    Prenatal care has been with Dr. Bailey.  It has been complicated by GBS carrier.    OB History    Para Term  AB Living   1 0 0 0 0 0   SAB IAB Ectopic Molar Multiple Live Births   0 0 0 0 0 0      # Outcome Date GA Lbr Reddy/2nd Weight Sex Delivery Anes PTL Lv   1 Current              Past Medical History:   Diagnosis Date   • Varicella      No past surgical history on file.  No current facility-administered medications for this encounter.    No Known Allergies  Social History    Tobacco Use      Smoking status: Former        Types: Cigarettes      Smokeless tobacco: Never    Review of Systems   Gastrointestinal: Positive for abdominal pain.   Genitourinary: Positive for vaginal discharge.   All other systems reviewed and are negative.        Objective   /78   Pulse 84   Temp 98.7 °F (37.1 °C) (Oral)   Resp 18   Ht 182.9 cm (72\")   Wt 85.3 kg (188 lb)   LMP 2022 (Approximate)   BMI 25.50 kg/m²   General: well developed; well nourished  mildly distressed   Abdomen: soft, non-tender; no masses  gravid    FHT's: reactive and category 1      Cervix: was checked (by RN): 3.5 cm / 75 % / -2 unchanged after 2 hours, refused check at 3 hours   Presentation: cephalic   Contractions: every 2-6 minutes   Chest: Unlabored respirations    CV:  RRR   Ext:   " No C/C/E   Back: CVA tenderness is deferred bilateral        Prenatal Labs  Lab Results   Component Value Date    HGB 11.9 01/25/2023    RUBELLAABIGG 4.11 11/14/2022    HEPBSAG Negative 11/14/2022    ABSCRN Negative 11/14/2022    AIW1XDX3 Non Reactive 11/14/2022    HEPCVIRUSABY <0.1 11/14/2022     01/25/2023    STREPGPB Positive (A) 03/15/2023    URINECX Final report 11/14/2022    CHLAMNAA Negative 11/14/2022    NGONORRHON Negative 11/14/2022       Current Labs Reviewed   UA:    Lab Results   Component Value Date    SQUAMEPIUA 13-20 (A) 03/28/2023    SPECGRAVUR 1.019 03/28/2023    KETONESU Negative 03/28/2023    BLOODU Moderate (2+) (A) 03/28/2023    LEUKOCYTESUR Moderate (2+) (A) 03/28/2023    NITRITEU Negative 03/28/2023    RBCUA 0-2 03/28/2023    WBCUA Too Numerous to Count (A) 03/28/2023    BACTERIA 4+ (A) 03/28/2023          Assessment   1. IUP at 38w1d  2. False labor > 37 weeks- no cervical change after 2+ hours     Plan   1. D/C home with labor precautions. Return for worsening symptoms, acute changes.  Keep regularly scheduled prenatal appointments     Eduard Quiros MD  3/28/2023  04:02 EDT

## 2023-03-28 NOTE — ANESTHESIA PREPROCEDURE EVALUATION
Anesthesia Evaluation     Patient summary reviewed and Nursing notes reviewed                Airway   Mallampati: II  TM distance: >3 FB  Neck ROM: full  Dental - normal exam     Pulmonary - negative pulmonary ROS and normal exam    breath sounds clear to auscultation  Cardiovascular - negative cardio ROS and normal exam    Rhythm: regular  Rate: normal    (-) angina, orthopnea, PND, PENA      Neuro/Psych- negative ROS  GI/Hepatic/Renal/Endo - negative ROS     Musculoskeletal (-) negative ROS    Abdominal    Substance History - negative use     OB/GYN negative ob/gyn ROS         Other - negative ROS                       Anesthesia Plan    ASA 2     epidural       Anesthetic plan, risks, benefits, and alternatives have been provided, discussed and informed consent has been obtained with: patient.        CODE STATUS:    Level Of Support Discussed With: Patient  Code Status (Patient has no pulse and is not breathing): CPR (Attempt to Resuscitate)  Medical Interventions (Patient has pulse or is breathing): Full Support  Release to patient: Routine Release

## 2023-03-28 NOTE — NURSING NOTE
Patient and family member informed of Dr. Quiros plan to monitor additional hour and if no change, then will be discharged.

## 2023-03-29 ENCOUNTER — PATIENT OUTREACH (OUTPATIENT)
Dept: LABOR AND DELIVERY | Facility: HOSPITAL | Age: 18
End: 2023-03-29
Payer: COMMERCIAL

## 2023-03-29 LAB
BACTERIA SPEC AEROBE CULT: NORMAL
BASOPHILS # BLD AUTO: 0.02 10*3/MM3 (ref 0–0.2)
BASOPHILS NFR BLD AUTO: 0.2 % (ref 0–1.5)
DEPRECATED RDW RBC AUTO: 37.6 FL (ref 37–54)
EOSINOPHIL # BLD AUTO: 0.04 10*3/MM3 (ref 0–0.4)
EOSINOPHIL NFR BLD AUTO: 0.3 % (ref 0.3–6.2)
ERYTHROCYTE [DISTWIDTH] IN BLOOD BY AUTOMATED COUNT: 11.9 % (ref 12.3–15.4)
HCT VFR BLD AUTO: 30.5 % (ref 34–46.6)
HGB BLD-MCNC: 10.7 G/DL (ref 12–15.9)
IMM GRANULOCYTES # BLD AUTO: 0.04 10*3/MM3 (ref 0–0.05)
IMM GRANULOCYTES NFR BLD AUTO: 0.3 % (ref 0–0.5)
LYMPHOCYTES # BLD AUTO: 2.22 10*3/MM3 (ref 0.7–3.1)
LYMPHOCYTES NFR BLD AUTO: 19.1 % (ref 19.6–45.3)
MCH RBC QN AUTO: 31.2 PG (ref 26.6–33)
MCHC RBC AUTO-ENTMCNC: 35.1 G/DL (ref 31.5–35.7)
MCV RBC AUTO: 88.9 FL (ref 79–97)
MONOCYTES # BLD AUTO: 0.66 10*3/MM3 (ref 0.1–0.9)
MONOCYTES NFR BLD AUTO: 5.7 % (ref 5–12)
NEUTROPHILS NFR BLD AUTO: 74.4 % (ref 42.7–76)
NEUTROPHILS NFR BLD AUTO: 8.62 10*3/MM3 (ref 1.7–7)
NRBC BLD AUTO-RTO: 0 /100 WBC (ref 0–0.2)
PLATELET # BLD AUTO: 200 10*3/MM3 (ref 140–450)
PMV BLD AUTO: 9.9 FL (ref 6–12)
RBC # BLD AUTO: 3.43 10*6/MM3 (ref 3.77–5.28)
WBC NRBC COR # BLD: 11.6 10*3/MM3 (ref 3.4–10.8)

## 2023-03-29 PROCEDURE — 85025 COMPLETE CBC W/AUTO DIFF WBC: CPT | Performed by: OBSTETRICS & GYNECOLOGY

## 2023-03-29 PROCEDURE — 0503F POSTPARTUM CARE VISIT: CPT | Performed by: NURSE PRACTITIONER

## 2023-03-29 RX ADMIN — IBUPROFEN 600 MG: 600 TABLET ORAL at 11:54

## 2023-03-29 RX ADMIN — DOCUSATE SODIUM 100 MG: 100 CAPSULE, LIQUID FILLED ORAL at 20:54

## 2023-03-29 RX ADMIN — ACETAMINOPHEN 650 MG: 325 TABLET, FILM COATED ORAL at 14:07

## 2023-03-29 RX ADMIN — IBUPROFEN 600 MG: 600 TABLET ORAL at 18:39

## 2023-03-29 RX ADMIN — DOCUSATE SODIUM 100 MG: 100 CAPSULE, LIQUID FILLED ORAL at 11:54

## 2023-03-29 NOTE — PROGRESS NOTES
Postpartum Progress Note      Status post Vaginal Delivery: Doing well postoperatively.     1) Postpartum care immediately following delivery :    Routine care, continue current care. Discharge home tomorrow    Rh status: A+  Rubella: Immune  Gender: Female    Subjective     Postpartum Day 1: Vaginal delivery    The patient feels tired. The patient denies emotional concerns. Pain is well controlled with current medications. The baby is well. The patient is ambulating well. The patient is tolerating a normal diet.     Objective     Vital signs in last 24 hours:  Temp:  [97.4 °F (36.3 °C)-98.4 °F (36.9 °C)] 98.1 °F (36.7 °C)  Heart Rate:  [] 74  Resp:  [16-19] 16  BP: ()/() 114/75      General:    alert, appears stated age and cooperative   CV: RRR, no m/r/g   Lungs: CTAB   Abdomen:  Soft, Non-tender    Lochia:  appropriate   Uterine Fundus:   firm   Ext    Edema trace   DVT Evaluation:  No evidence of DVT seen on physical exam.     Lab Results   Component Value Date    WBC 11.60 (H) 03/29/2023    HGB 10.7 (L) 03/29/2023    HCT 30.5 (L) 03/29/2023    MCV 88.9 03/29/2023     03/29/2023       Sri Meza, APRN  3/29/2023  08:39 EDT

## 2023-03-29 NOTE — PLAN OF CARE
Problem: Adult Inpatient Plan of Care  Goal: Plan of Care Review  Outcome: Ongoing, Progressing  Flowsheets (Taken 3/28/2023 2046)  Progress: improving  Plan of Care Reviewed With: patient  Goal: Patient-Specific Goal (Individualized)  Outcome: Ongoing, Progressing  Goal: Absence of Hospital-Acquired Illness or Injury  Outcome: Ongoing, Progressing  Intervention: Identify and Manage Fall Risk  Description: Perform standard risk assessment on admission using a validated tool or comprehensive approach appropriate to the patient; reassess fall risk frequently, with change in status or transfer to another level of care.  Communicate fall injury risk to interprofessional healthcare team.  Determine need for increased observation, equipment and environmental modification, such as low bed, signage and supportive, nonskid footwear.  Adjust safety measures to individual developmental age, stage and identified risk factors.  Reinforce the importance of safety and physical activity with patient and family.  Perform regular intentional rounding to assess need for position change, pain assessment and personal needs, including assistance with toileting.  Recent Flowsheet Documentation  Taken 3/28/2023 2032 by Suzanna Diaz RN  Safety Promotion/Fall Prevention: safety round/check completed  Taken 3/28/2023 1940 by Suzanna Diaz RN  Safety Promotion/Fall Prevention: safety round/check completed  Intervention: Prevent and Manage VTE (Venous Thromboembolism) Risk  Description: Assess for VTE (venous thromboembolism) risk.  Encourage and assist with early ambulation.  Initiate and maintain compression or other therapy, as indicated, based on identified risk in accordance with organizational protocol and provider order.  Encourage both active and passive leg exercises while in bed, if unable to ambulate.  Recent Flowsheet Documentation  Taken 3/28/2023 1940 by Suzanna Diaz RN  Activity  Management:   activity adjusted per tolerance   bedrest  Goal: Optimal Comfort and Wellbeing  Outcome: Ongoing, Progressing  Intervention: Monitor Pain and Promote Comfort  Description: Assess pain level, treatment efficacy and patient response at regular intervals using a consistent pain scale.  Consider the presence and impact of preexisting chronic pain.  Encourage patient and caregiver involvement in pain assessment, interventions and safety measures.  Recent Flowsheet Documentation  Taken 3/28/2023 2032 by Suzanna Diaz RN  Pain Management Interventions: see MAR  Taken 3/28/2023 1940 by Suzanna Diaz RN  Pain Management Interventions: pain management plan reviewed with patient/caregiver  Intervention: Provide Person-Centered Care  Description: Use a family-focused approach to care.  Develop trust and rapport by proactively providing information, encouraging questions, addressing concerns and offering reassurance.  Acknowledge emotional response to hospitalization.  Recognize and utilize personal coping strategies.  Honor spiritual and cultural preferences.  Recent Flowsheet Documentation  Taken 3/28/2023 1940 by Suzanna Diaz RN  Trust Relationship/Rapport:   care explained   thoughts/feelings acknowledged  Goal: Readiness for Transition of Care  Outcome: Ongoing, Progressing   Goal Outcome Evaluation:  Plan of Care Reviewed With: patient        Progress: improving

## 2023-03-29 NOTE — OUTREACH NOTE
Motherhood Connection  IP Postpartum    Questions/Answers    Flowsheet Row Responses   Best Method for Contacting Cell   Support Person Present Yes   Does the patient have a car seat at the hospital Yes   Delivery Note Reviewed Reviewed   Were birth expectations met? Yes   Is there a need for additional support/resources? No   Lactation Note Reviewed Reviewed   Is additional support needed? No   Any questions or concerns? No   Is the patient going to use Meds to Beds? Yes   Any concerns related discharge meds/ability to  prescriptions? No   Confirm Postpartum OB appointment Yes  [knows to schedule]   Confirm initial well-child Pediatrician appointment date/time: Yes  [knows to schedule]   Additional post-discharge F/U appointments No   Does patient have transportation to appointments? Yes   Any other assistance needed to ensure she is able to attend appointments? No   Does patient have supplies needed at home for  care? Clothing, Crib, Diapers, Formula          Pt doing well, no c/o.  Says labor went quickly.  Knows to set up PP appts for her and baby.  She will call to set up WIC once d/c home.  Encouraged her to reach out with any needs.  IP EPDS sent today.    F/U for PP check in next Tuesday, 4.4.23.    Jose Fernández RN  Maternity Nurse Navigator    3/29/2023, 15:32 EDT
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
## 2023-03-30 VITALS
OXYGEN SATURATION: 99 % | RESPIRATION RATE: 16 BRPM | TEMPERATURE: 97.1 F | SYSTOLIC BLOOD PRESSURE: 115 MMHG | DIASTOLIC BLOOD PRESSURE: 75 MMHG | HEART RATE: 69 BPM

## 2023-03-30 PROCEDURE — 0503F POSTPARTUM CARE VISIT: CPT | Performed by: OBSTETRICS & GYNECOLOGY

## 2023-03-30 RX ORDER — IBUPROFEN 600 MG/1
600 TABLET ORAL EVERY 6 HOURS PRN
Qty: 28 TABLET | Refills: 0 | Status: SHIPPED | OUTPATIENT
Start: 2023-03-30 | End: 2023-04-06

## 2023-03-30 RX ADMIN — DOCUSATE SODIUM 100 MG: 100 CAPSULE, LIQUID FILLED ORAL at 09:08

## 2023-03-30 RX ADMIN — Medication 1 TABLET: at 09:08

## 2023-03-30 NOTE — DISCHARGE SUMMARY
[facility]         DISCHARGE SUMMARY    Patient Name: Shelley Lackey  : 2005  MRN: [id number]    Date of Admission: 3/28/2023  Date of Discharge:  3/30/23  Primary Care Physician: Provider, No Known    Consults     No orders found from 2023 to 3/29/2023.          Presenting Problem:   Pregnancy [Z34.90]    Active and Resolved Hospital Problems:  Active Hospital Problems    Diagnosis POA   • ** (normal spontaneous vaginal delivery) [O80] Not Applicable   • Pregnancy [Z34.90] Not Applicable   • GBS (group B Streptococcus carrier), +RV culture, currently pregnant [O99.820] Not Applicable      Resolved Hospital Problems   No resolved problems to display.         Hospital Course     Hospital Course:  Shelley Lackey is a 17 y.o. female patient presented with labor.  Delivery was unremarkable.  Postpartum course was unremarkable.  By postpartum day #2 she was meeting all milestones for discharge.  Extensive discharge instructions given to the patient verbalized understanding.  She will follow-up in the office in 6 weeks    Day of Discharge     Vital Signs:  Temp:  [97.1 °F (36.2 °C)-98.2 °F (36.8 °C)] 97.1 °F (36.2 °C)  Heart Rate:  [69-83] 69  Resp:  [16] 16  BP: (110-115)/(71-75) 115/75  Physical Exam:  See progress notes    Pertinent  and/or Most Recent Results     LAB RESULTS:      Lab 23  0710 23  1153   WBC 11.60* 16.81*   HEMOGLOBIN 10.7* 13.9   HEMATOCRIT 30.5* 39.8   PLATELETS 200 280   NEUTROS ABS 8.62* 15.53*   IMMATURE GRANS (ABS) 0.04 0.10*   LYMPHS ABS 2.22 0.73   MONOS ABS 0.66 0.42   EOS ABS 0.04 0.00   MCV 88.9 89.2         Lab 23  1153   SODIUM 136   POTASSIUM 3.5   CHLORIDE 98   CO2 20.0*   ANION GAP 18.0*   BUN 8   CREATININE 0.76   EGFR 115.9   GLUCOSE 142*   CALCIUM 9.3         Lab 23  1153   TOTAL PROTEIN 7.1   ALBUMIN 3.8   GLOBULIN 3.3   ALT (SGPT) 7   AST (SGOT) 15   BILIRUBIN 0.4   ALK PHOS 147*                 Lab 23  1153   ABO TYPING  A   RH TYPING Positive   ANTIBODY SCREEN Negative         Brief Urine Lab Results  (Last result in the past 365 days)      Color   Clarity   Blood   Leuk Est   Nitrite   Protein   CREAT   Urine HCG        03/28/23 0242 Yellow   Cloudy   Moderate (2+)   Moderate (2+)   Negative   Trace               Microbiology Results (last 10 days)     Procedure Component Value - Date/Time    Urine Culture - Urine, Urine, Clean Catch [[id number]] Collected: 03/28/23 0242    Lab Status: Final result Specimen: Urine, Clean Catch Updated: 03/29/23 0755     Urine Culture >100,000 CFU/mL Normal Urogenital Bibiana    Narrative:      Colonization of the urinary tract without infection is common. Treatment is discouraged unless the patient is symptomatic, pregnant, or undergoing an invasive urologic procedure.                           Labs Pending at Discharge: na        Discharge Details        Discharge Medications      New Medications      Instructions Start Date   ibuprofen 600 MG tablet  Commonly known as: ADVIL,MOTRIN   600 mg, Oral, Every 6 Hours PRN         Continue These Medications      Instructions Start Date   Prenatal 28-0.8 MG tablet   1 tablet, Oral, Daily, Please use formulary or generic, with DHA ideal         Stop These Medications    promethazine 25 MG tablet  Commonly known as: PHENERGAN            No Known Allergies      Discharge Disposition:  Home or Self Care    Diet:  Hospital:  Diet Order   Procedures   • Diet: Regular/House Diet; Texture: Regular Texture (IDDSI 7); Fluid Consistency: Thin (IDDSI 0)         Discharge Activity:    Gradually resume normal activities over the next 1-2 weeks.  It is normal to have light to moderate vaginal bleeding for up to 6 weeks after delivery.  You may shower.  You may do sitz bath 3 times a day as needed.  Continue perineal care as you have been doing in the hospital.  No submerging in a bathtub for 6 weeks after delivery.  Do not resume sexual intercourse for 6 weeks.  Call the  office or return to the hospital for temperature greater than 100.5°F, heavy vaginal bleeding soaking a pad an hour, severe abdominal or vaginal  pain not improved with pain medication, persistent nausea and vomiting, wound breakdown, or any other medical concerns.  Follow-up with your OB/GYN in 6 weeks.  Please call the office to make an appointment.        CODE STATUS:  Code Status and Medical Interventions:   Ordered at: 03/28/23 1940     Code Status (Patient has no pulse and is not breathing):    CPR (Attempt to Resuscitate)     Medical Interventions (Patient has pulse or is breathing):    Full         Future Appointments   Date Time Provider Department Center   4/4/2023 10:30 AM Nomi Cardoza MD [facility]       Additional Instructions for the Follow-ups that You Need to Schedule     Discharge Follow-up with Specified Provider: your OBGYN; 6 Weeks   As directed      To: your OBGYN    Follow Up: 6 Weeks               Time spent on Discharge including face to face service:  Greater than 30 minutes    Nomi Cardoza MD

## 2023-03-30 NOTE — PROGRESS NOTES
Assessment/Plan:  Status post Vaginal Delivery, PPD 2. Doing well postpatum.     Plan:  1. Patient is doing well today.  Meeting all milestones for discharge.  Extensive discharge instructions given to the patient who verbalized understanding.  She should follow up with her OB/GYN in 6 weeks.         Rh: No results found for: ABORH   Rh+  Rubella:immune  Infant:fame    Subjective:  Postpartum Day 2: Vaginal Delivery    The patient feels well.Pain is well controlled with current medications. Urinary output is adequate. The patient is ambulating well. The patient is tolerating a normal diet. Flatus has been passed.    Objective:  Vital signs in last 24 hours:  Temp:  [97.1 °F (36.2 °C)-98.2 °F (36.8 °C)] 97.1 °F (36.2 °C)  Heart Rate:  [69-83] 69  Resp:  [16] 16  BP: (110-115)/(71-75) 115/75    No intake/output data recorded.  No intake/output data recorded.          General:    alert, appears stated age and cooperative   Uterine Fundus:   firm   Abdomen:  Soft, nontender, nondistended   DVT Evaluation:  No evidence of DVT seen on physical exam.     Lab Results   Component Value Date    WBC 11.60 (H) 03/29/2023    HGB 10.7 (L) 03/29/2023    HCT 30.5 (L) 03/29/2023    MCV 88.9 03/29/2023     03/29/2023       Lab Results   Component Value Date    GLUCOSE 142 (H) 03/28/2023    BUN 8 03/28/2023    CREATININE 0.76 03/28/2023    EGFRRESULT 137.5 12/12/2022    EGFR 115.9 03/28/2023    BCR 10.5 03/28/2023    K 3.5 03/28/2023    CO2 20.0 (L) 03/28/2023    CALCIUM 9.3 03/28/2023    PROTENTOTREF 6.2 12/12/2022    ALBUMIN 3.8 03/28/2023    BILITOT 0.4 03/28/2023    AST 15 03/28/2023    ALT 7 03/28/2023

## 2023-04-03 NOTE — PROGRESS NOTES
Continued Stay Note  [facility]     Patient Name: Shelley Lackey  MRN: [id number]  Today's Date: 4/3/2023    Admit Date: 3/28/2023    Plan: Infant may discharge to mother when medically ready. CSW will follow cord toxicology. LORENZO Keita   Discharge Plan     Row Name 04/03/23 1405       Plan    Plan Comments Mother: Shelley Lackey, MRN [id number]. Infant: Mario Alberto “America” Darya, MRN [id number]. CSW reviewed cord toxicology for infant, and it was positive for Delta-9 Carboxy THC; this has been lab confirmed. CSW submitted a CPS report (WebID #[id number]). LORENZO Ford.               Discharge Codes    No documentation.               Expected Discharge Date and Time     Expected Discharge Date Expected Discharge Time    Mar 30, 2023             ROMAN Hills

## 2023-04-04 ENCOUNTER — PATIENT OUTREACH (OUTPATIENT)
Dept: LABOR AND DELIVERY | Facility: HOSPITAL | Age: 18
End: 2023-04-04
Payer: COMMERCIAL

## 2023-04-04 NOTE — OUTREACH NOTE
Motherhood Connection  Unable to Reach       Questions/Answers    Flowsheet Row Responses   Pending Outreach Postpartum Check-in   Call Attempt First   Outcome Left message          Pt has been UTR for intake and check in appts.  Saw on MB for IP last week.  Left vm encouraging her to reach out with any needs.  Sent to call center today.  Sent [employer] grad letter.    Jose Fernández RN  Maternity Nurse Navigator    4/4/2023, 13:57 EDT

## 2023-04-05 ENCOUNTER — TELEPHONE (OUTPATIENT)
Dept: OBSTETRICS AND GYNECOLOGY | Facility: CLINIC | Age: 18
End: 2023-04-05
Payer: COMMERCIAL

## 2023-04-05 NOTE — TELEPHONE ENCOUNTER
----- Message from Rhea Clark MA sent at 3/31/2023  3:26 PM EDT -----  L/m for pt/nayeli  ----- Message -----  From: Rhea Clark MA  Sent: 3/30/2023   1:37 PM EDT  To: Rhea Clark MA    L/m for pt/nayeli  ----- Message -----  From: Ramez Bailey MD  Sent: 3/29/2023   8:56 AM EDT  To: ANA Gagnon, urine culture did not show UTI. Please let her know. Thanks Dr. Baiely

## 2023-04-11 ENCOUNTER — PATIENT OUTREACH (OUTPATIENT)
Dept: CALL CENTER | Facility: HOSPITAL | Age: 18
End: 2023-04-11
Payer: COMMERCIAL

## 2023-04-11 NOTE — OUTREACH NOTE
Motherhood Connection Survey    Flowsheet Row Responses   [facility]   [facility] attempt successful? Yes   Call start time 1058   Call end time 1102   Baby sex Girl   Baby sex Girl   Delivery type Vaginal   Emotional state Acceptance   Family support Yes   Do you have all necessary resources to care for you and your baby?  Yes   Have members of your household adjusted to your baby? Yes   Did you have any problems with pre-eclampsia during this pregnancy? No   Did you have blood glucose issues during this pregnancy No   Lochia amount None   Did you have an episiotomy/tear/abdominal incision? Yes   Additional comments Healing well   Feeding Method Bottle   Frequency 4 hours   Amount 3 ounces   Breast Condition No   Nipple Condition No   Signs baby is ready to eat Rooting, Finger sucking, Crying   Number of wet diapers x 24 hours 7   Last BM x 24 hours 1-2   Umbilical Cord No reported signs or symptoms   Where does the baby usually sleep? Bassinet   Are there stuffed animals, toys, pillows, quilts, blankets, wedges, positioners, bumpers or other loose bedding in the infant's sleeping environment? No   Does the baby ever share a sleep surface in a bed, couch, recliner or other? No   What position do you lay your baby down to sleep? Back   Mom appointment comments: Call to schedule   Baby appointment comments: 4/15/23   Call completed? Yes   How satisfied were you with the Motherhood Connection Program? 5   Anyone you would like to recognize from your time in the Motherhood Connection Program Night labor and delivery nurse.            Kelsey BALBUENA - Registered Nurse

## 2023-04-11 NOTE — OUTREACH NOTE
Motherhood Connection Survey    Flowsheet Row Responses   [facility] patient discharged from? [place]   Week 1 attempt successful? No   Unsuccessful attempts Attempt 1   Reschedule Today            Kelsey BALBUENA - Registered Nurse